# Patient Record
Sex: FEMALE | Race: WHITE | Employment: UNEMPLOYED | URBAN - METROPOLITAN AREA
[De-identification: names, ages, dates, MRNs, and addresses within clinical notes are randomized per-mention and may not be internally consistent; named-entity substitution may affect disease eponyms.]

---

## 2023-02-15 ENCOUNTER — ANESTHESIA EVENT (OUTPATIENT)
Dept: ENDOSCOPY | Age: 38
End: 2023-02-15
Payer: COMMERCIAL

## 2023-02-16 ENCOUNTER — ANESTHESIA (OUTPATIENT)
Dept: ENDOSCOPY | Age: 38
End: 2023-02-16
Payer: COMMERCIAL

## 2023-02-16 ENCOUNTER — HOSPITAL ENCOUNTER (OUTPATIENT)
Age: 38
Setting detail: OUTPATIENT SURGERY
Discharge: HOME | End: 2023-02-16
Attending: INTERNAL MEDICINE | Admitting: INTERNAL MEDICINE
Payer: COMMERCIAL

## 2023-02-16 VITALS
DIASTOLIC BLOOD PRESSURE: 81 MMHG | HEIGHT: 64 IN | WEIGHT: 115 LBS | OXYGEN SATURATION: 99 % | TEMPERATURE: 97.4 F | HEART RATE: 86 BPM | BODY MASS INDEX: 19.63 KG/M2 | SYSTOLIC BLOOD PRESSURE: 117 MMHG | RESPIRATION RATE: 18 BRPM

## 2023-02-16 DIAGNOSIS — R13.12 OROPHARYNGEAL DYSPHAGIA: ICD-10-CM

## 2023-02-16 DIAGNOSIS — K62.89 NODULE OF ANUS: Primary | ICD-10-CM

## 2023-02-16 DIAGNOSIS — K50.80 CROHN'S DISEASE OF BOTH SMALL AND LARGE INTESTINE WITHOUT COMPLICATION (HCC): ICD-10-CM

## 2023-02-16 LAB — PREGNANCY, URINE: NEGATIVE

## 2023-02-16 PROCEDURE — 88305 TISSUE EXAM BY PATHOLOGIST: CPT

## 2023-02-16 PROCEDURE — 2580000003 HC RX 258: Performed by: ANESTHESIOLOGY

## 2023-02-16 PROCEDURE — 6360000002 HC RX W HCPCS: Performed by: NURSE ANESTHETIST, CERTIFIED REGISTERED

## 2023-02-16 PROCEDURE — 3700000001 HC ADD 15 MINUTES (ANESTHESIA): Performed by: INTERNAL MEDICINE

## 2023-02-16 PROCEDURE — 84703 CHORIONIC GONADOTROPIN ASSAY: CPT

## 2023-02-16 PROCEDURE — 3609010300 HC COLONOSCOPY W/BIOPSY SINGLE/MULTIPLE: Performed by: INTERNAL MEDICINE

## 2023-02-16 PROCEDURE — 2500000003 HC RX 250 WO HCPCS: Performed by: NURSE ANESTHETIST, CERTIFIED REGISTERED

## 2023-02-16 PROCEDURE — 3609012700 HC EGD DILATION SAVORY: Performed by: INTERNAL MEDICINE

## 2023-02-16 PROCEDURE — 7100000011 HC PHASE II RECOVERY - ADDTL 15 MIN: Performed by: INTERNAL MEDICINE

## 2023-02-16 PROCEDURE — 7100000010 HC PHASE II RECOVERY - FIRST 15 MIN: Performed by: INTERNAL MEDICINE

## 2023-02-16 PROCEDURE — 2709999900 HC NON-CHARGEABLE SUPPLY: Performed by: INTERNAL MEDICINE

## 2023-02-16 PROCEDURE — C1769 GUIDE WIRE: HCPCS | Performed by: INTERNAL MEDICINE

## 2023-02-16 PROCEDURE — 3700000000 HC ANESTHESIA ATTENDED CARE: Performed by: INTERNAL MEDICINE

## 2023-02-16 RX ORDER — MELATONIN
COMMUNITY
Start: 2023-01-15

## 2023-02-16 RX ORDER — ONDANSETRON 4 MG/1
TABLET, FILM COATED ORAL
COMMUNITY
Start: 2022-12-08

## 2023-02-16 RX ORDER — SODIUM CHLORIDE, SODIUM LACTATE, POTASSIUM CHLORIDE, CALCIUM CHLORIDE 600; 310; 30; 20 MG/100ML; MG/100ML; MG/100ML; MG/100ML
INJECTION, SOLUTION INTRAVENOUS CONTINUOUS
Status: DISCONTINUED | OUTPATIENT
Start: 2023-02-16 | End: 2023-02-16 | Stop reason: HOSPADM

## 2023-02-16 RX ORDER — ONDANSETRON 2 MG/ML
INJECTION INTRAMUSCULAR; INTRAVENOUS PRN
Status: DISCONTINUED | OUTPATIENT
Start: 2023-02-16 | End: 2023-02-16 | Stop reason: SDUPTHER

## 2023-02-16 RX ORDER — NORETHINDRONE ACETATE AND ETHINYL ESTRADIOL AND FERROUS FUMARATE 1MG-20(24)
KIT ORAL
COMMUNITY
Start: 2023-02-13

## 2023-02-16 RX ORDER — CITALOPRAM 40 MG/1
40 TABLET ORAL DAILY
COMMUNITY

## 2023-02-16 RX ORDER — FENTANYL CITRATE 50 UG/ML
INJECTION, SOLUTION INTRAMUSCULAR; INTRAVENOUS PRN
Status: DISCONTINUED | OUTPATIENT
Start: 2023-02-16 | End: 2023-02-16 | Stop reason: SDUPTHER

## 2023-02-16 RX ORDER — DEXAMETHASONE SODIUM PHOSPHATE 4 MG/ML
INJECTION, SOLUTION INTRA-ARTICULAR; INTRALESIONAL; INTRAMUSCULAR; INTRAVENOUS; SOFT TISSUE PRN
Status: DISCONTINUED | OUTPATIENT
Start: 2023-02-16 | End: 2023-02-16 | Stop reason: SDUPTHER

## 2023-02-16 RX ORDER — LIDOCAINE HYDROCHLORIDE 20 MG/ML
INJECTION, SOLUTION EPIDURAL; INFILTRATION; INTRACAUDAL; PERINEURAL PRN
Status: DISCONTINUED | OUTPATIENT
Start: 2023-02-16 | End: 2023-02-16 | Stop reason: SDUPTHER

## 2023-02-16 RX ORDER — PROPOFOL 10 MG/ML
INJECTION, EMULSION INTRAVENOUS PRN
Status: DISCONTINUED | OUTPATIENT
Start: 2023-02-16 | End: 2023-02-16 | Stop reason: SDUPTHER

## 2023-02-16 RX ORDER — LANOLIN ALCOHOL/MO/W.PET/CERES
CREAM (GRAM) TOPICAL
COMMUNITY
Start: 2023-01-15

## 2023-02-16 RX ADMIN — SODIUM CHLORIDE, POTASSIUM CHLORIDE, SODIUM LACTATE AND CALCIUM CHLORIDE: 600; 310; 30; 20 INJECTION, SOLUTION INTRAVENOUS at 11:40

## 2023-02-16 RX ADMIN — PROPOFOL 150 MCG/KG/MIN: 10 INJECTION, EMULSION INTRAVENOUS at 12:42

## 2023-02-16 RX ADMIN — PROPOFOL 50 MG: 10 INJECTION, EMULSION INTRAVENOUS at 12:53

## 2023-02-16 RX ADMIN — LIDOCAINE HYDROCHLORIDE 100 MG: 20 INJECTION, SOLUTION EPIDURAL; INFILTRATION; INTRACAUDAL; PERINEURAL at 12:42

## 2023-02-16 RX ADMIN — FENTANYL CITRATE 50 MCG: 50 INJECTION, SOLUTION INTRAMUSCULAR; INTRAVENOUS at 12:44

## 2023-02-16 RX ADMIN — PROPOFOL 50 MG: 10 INJECTION, EMULSION INTRAVENOUS at 12:47

## 2023-02-16 RX ADMIN — DEXAMETHASONE SODIUM PHOSPHATE 4 MG: 4 INJECTION, SOLUTION INTRAMUSCULAR; INTRAVENOUS at 12:45

## 2023-02-16 RX ADMIN — PROPOFOL 100 MG: 10 INJECTION, EMULSION INTRAVENOUS at 12:42

## 2023-02-16 RX ADMIN — PROPOFOL 50 MG: 10 INJECTION, EMULSION INTRAVENOUS at 12:51

## 2023-02-16 RX ADMIN — ONDANSETRON 4 MG: 2 INJECTION INTRAMUSCULAR; INTRAVENOUS at 13:32

## 2023-02-16 RX ADMIN — PROPOFOL 50 MG: 10 INJECTION, EMULSION INTRAVENOUS at 12:43

## 2023-02-16 RX ADMIN — PROPOFOL 30 MG: 10 INJECTION, EMULSION INTRAVENOUS at 13:11

## 2023-02-16 RX ADMIN — PROPOFOL 40 MG: 10 INJECTION, EMULSION INTRAVENOUS at 13:16

## 2023-02-16 RX ADMIN — PROPOFOL 50 MG: 10 INJECTION, EMULSION INTRAVENOUS at 13:28

## 2023-02-16 RX ADMIN — PROPOFOL 50 MG: 10 INJECTION, EMULSION INTRAVENOUS at 12:44

## 2023-02-16 RX ADMIN — PROPOFOL 50 MG: 10 INJECTION, EMULSION INTRAVENOUS at 13:10

## 2023-02-16 RX ADMIN — FENTANYL CITRATE 50 MCG: 50 INJECTION, SOLUTION INTRAMUSCULAR; INTRAVENOUS at 12:42

## 2023-02-16 ASSESSMENT — PAIN DESCRIPTION - DESCRIPTORS: DESCRIPTORS: CRAMPING

## 2023-02-16 ASSESSMENT — PAIN SCALES - GENERAL
PAINLEVEL_OUTOF10: 2

## 2023-02-16 ASSESSMENT — PAIN - FUNCTIONAL ASSESSMENT
PAIN_FUNCTIONAL_ASSESSMENT: 0-10
PAIN_FUNCTIONAL_ASSESSMENT: ACTIVITIES ARE NOT PREVENTED

## 2023-02-16 NOTE — PROCEDURES
EGD/COLONOSCOPY     Patient: Eliz Randall MRN: [id number]   YOB: 1985 Age: 30 y.o. Sex: female   Unit: [address] [place], [place] [address]e N ENDOSCOPY Room/Bed: Endo Pool/NONE Location: [address] [place] [address]    Admitting Physician: Estefany Fischer     Primary Care Physician: Cesar Llamas MD      DATE OF PROCEDURE: 2/16/2023  PROCEDURE: EGD/Colonoscopy    PREOPERATIVE DIAGNOSIS: Crohn's disease of both small and large intestine without complication ([address] [V50.98]  Oropharyngeal dysphagia [R13.12]  HPI: Colonoscopy for large and small bowel Crohn's disease. EGD for esophageal dysphagia. 79-year-old female with history of Crohn's disease as above status post ileocecectomy with primary anastomosis. She has been on stable Remicade for years. She does have some mild dysphagia. Of note, she has noted some gradually worsening intermittent anal pain and sometimes bleeding which she attributes to hemorrhoids. ENDOSCOPIST: Alma Marroquin MD    POSTOPERATIVE DIAGNOSIS:    -Mild lower esophageal Schatzki ring, dilated up to 50 [address] Susan with mild mucosal abrasion on second look esophagoscopy. -Stomach and duodenum unremarkable to the second portion.  -Limited views in the neoTI unremarkable in the distal 3 cm  -Evidence of ileocectomy with end to side ileocolonic anastomosis, with a few small erosions at the anastomosis  -Unremarkable colon with quadrantic biopsies every 10 cm in the proximal and distal colon  -External skin tags and external hemorrhoids.  -Nodular posterior anal fissure, with presumed benign nodule a dentate line, biopsied.   Another 2 mm nodule at the lateral dentate line, removed with biopsy forcep    PLAN:   -We will follow-up with biopsy results and update the patient in about 1 to 2 weeks.  -Continue Remicade.  -We will refer the patient to Dr. Lupis kennedy of colorectal surgery for proctoscopic exam to carefully biopsy and rule out any other perianal pathology that might contribute to the nodularity, and is well discuss any potential treatment options for her hemorrhoids    INFORMED CONSENT:  Informed consent for colonoscopy was obtained. The benefits and risks including adverse medicine reaction and perforation have been explained. The patient's questions were answered and the patient agreed to proceed. ASA: ASA 2 - Patient with mild systemic disease with no functional limitations     SEDATION: MAC    The patient's vital signs, cardiac status, pulmonary status, abdominal status and mental status were stable for the procedure. The patient's vital signs and respiratory function as monitored by oxygen saturation remained stable. COLON PREPARATION:  The patient was given a split colon preparation and the preparation was adequate. EGD/Colonoscopy Procedure Details:      Procedure Details: The Olympus videoendoscope was inserted into the mouth and carefully passed into the esophagus, through the stomach and to the distal duodenum. Retroflexion in the cardia and fundus was performed. Next, a digital rectal exam was performed. The Olympus videocolonoscope  was inserted in the rectum and carefully advanced to the cecum as identified by IC valve, crow's foot appearance and appendix. Retroflexion in the right colon was performed more than the endoscope was straightened and advanced back to the cecum. The colonoscope was slowly withdrawn with careful inspection around and between folds. Retroflexion in the rectum was performed. Cecum Intubated: No: Because of surgical anatomy. See report    Findings: The esophagus is normal.  The Z-line, gastroesophageal junction and diaphragm are all at about the same level. There was a trivial Schatzki ring. Wire-guided savory dilation was performed from 42 up to 50 [place] Susan with minor mucosal abrasion at the Schatzki ring. The scope easily passed into the stomach.  The mucosa of the cardia, fundus, body and antrum of the stomach is normal.  The pylorus is patent and of normal contour. The scope easily advanced into the duodenal bulb and down to the 2nd portion of the duodenum. The neoterminal ileum was examined approximately 3 cm proximal to the ileocolonic anastomosis and was unremarkable. The ileal colonic anastomosis itself was a healthy appearing side to and, with a few erosions at the anastomosis itself which we did not believe needed to be biopsied. The residual a sending and transverse colon were unremarkable with quadrantic biopsies every 10 cm. The descending, sigmoid and rectum were unremarkable with quadrantic biopsies every 10 cm. There were 2 small nodules at the dentate line, a 2 mm nodule on the lateral aspect of the dentate line with the patient in the supine position, and a larger 4 mm nodule at the dentate line on the posterior aspect of the anal canal.  Both of these were biopsied. There was a nodular posterior anal fissure in the anal canal that we did not attempt a biopsy. On digital rectal exam, there were abundant skin tags and external hemorrhoids without thrombosis.       Estimated Blood Loss:  Minimal  Complications: None    Signed By: Ben Sharp MD

## 2023-02-16 NOTE — ANESTHESIA PRE PROCEDURE
Department of Anesthesiology  Preprocedure Note       Name:  Eliz Randall   Age:  30 y.o.  :  1985                                          MRN:  [id number]         Date:  2023      Surgeon: Deisi Signs):  Oscar Vallejo MD    Procedure: Procedure(s):  ESOPHAGOGASTRODUODENOSCOPY  COLONOSCOPY    Medications prior to admission:   Prior to Admission medications    Not on File       Current medications:    No current facility-administered medications for this encounter. Allergies: Allergies   Allergen Reactions    Cefuroxime Axetil Nausea And Vomiting    Imidazole Antifungals Nausea And Vomiting    Metronidazole Nausea And Vomiting    Sulfa Antibiotics Nausea And Vomiting    Sulfamethoxazole-Trimethoprim Nausea And Vomiting       Problem List:  There is no problem list on file for this patient. Past Medical History:  History reviewed. No pertinent past medical history. Past Surgical History:  History reviewed. No pertinent surgical history. Social History:    Social History     Tobacco Use    Smoking status: Not on file    Smokeless tobacco: Not on file   Substance Use Topics    Alcohol use: Not on file                                Counseling given: Not Answered      Vital Signs (Current): There were no vitals filed for this visit. BP Readings from Last 3 Encounters:   No data found for BP       NPO Status:                                                                                 BMI:   Wt Readings from Last 3 Encounters:   No data found for Wt     There is no height or weight on file to calculate BMI.    CBC: No results found for: WBC, RBC, HGB, HCT, MCV, RDW, PLT    CMP: No results found for: NA, K, CL, CO2, BUN, CREATININE, GFRAA, AGRATIO, LABGLOM, GLUCOSE, GLU, PROT, CALCIUM, BILITOT, ALKPHOS, AST, ALT    POC Tests: No results for input(s): POCGLU, POCNA, POCK, POCCL, POCBUN, POCHEMO, POCHCT in the last 72 hours.     Coags: No results found for: PROTIME, INR, APTT    HCG (If Applicable): No results found for: PREGTESTUR, PREGSERUM, HCG, HCGQUANT     ABGs: No results found for: PHART, PO2ART, JGM9DSQ, KHT5TUM, BEART, J8OMNBAF     Type & Screen (If Applicable):  No results found for: LABABO, LABRH    Drug/Infectious Status (If Applicable):  No results found for: HIV, HEPCAB    COVID-19 Screening (If Applicable): No results found for: COVID19        Anesthesia Evaluation  Patient summary reviewed and Nursing notes reviewed no history of anesthetic complications:   Airway: Mallampati: I  TM distance: >3 FB   Neck ROM: full  Mouth opening: > = 3 FB   Dental: normal exam         Pulmonary:Negative Pulmonary ROS and normal exam                               Cardiovascular:Negative CV ROS                      Neuro/Psych:   Negative Neuro/Psych ROS              GI/Hepatic/Renal:            ROS comment: Crohn's disease of both small and large intestine.   Endo/Other: Negative Endo/Other ROS                    Abdominal:             Vascular: negative vascular ROS.         Other Findings:           Anesthesia Plan      MAC     ASA 2       Induction: intravenous.    MIPS: Prophylactic antiemetics administered.  Anesthetic plan and risks discussed with patient.      Plan discussed with CRNA.    Attending anesthesiologist reviewed and agrees with Preprocedure content                Merlin Gutierrez MD   2/16/2023

## 2023-02-16 NOTE — PROGRESS NOTES
Discharge instructions given to patient and Mother at bedside. IV dc/d, dressed and discharged home. Wife driving her home. Patient voices understanding of consult for Dr. Arsen Rebolledo.

## 2023-02-16 NOTE — H&P
Gastroenterology Outpatient History and Physical     Patient: Nya Badillo MRN: [id number] Sex: female   YOB: 1985 Age: 30 y.o. Location: [address] [place] [address]    Date:2/16/2023  Primary Care Physician: Oj Leone MD         Patient: Nya Badillo    Physician: Pablo Correa MD    History of Present Illness: Colonoscopy for large and small bowel Crohn's disease. EGD for esophageal dysphagia. 29-year-old female with history of Crohn's disease as above status post ileocecectomy with primary anastomosis. She has been on stable Remicade for years. She does have some mild dysphagia. Of note, she has noted some gradually worsening intermittent anal pain and sometimes bleeding which she attributes to hemorrhoids. Review of Systems:  Weight Loss: No  Dysphagia: No  Dyspepsia: No  History:  Past Medical History:   Diagnosis Date    Crohn's disease ([address]     Kidney stone       Past Surgical History:   Procedure Laterality Date    ABDOMEN SURGERY        Social History     Socioeconomic History    Marital status:      Spouse name: None    Number of children: None    Years of education: None    Highest education level: None   Tobacco Use    Smoking status: Never    Smokeless tobacco: Never   Substance and Sexual Activity    Alcohol use: Yes     Comment: 1 drink weekly    Drug use: Never    Sexual activity: Yes     Partners: Male      History reviewed. No pertinent family history. Allergies: Allergies   Allergen Reactions    Cefuroxime Axetil Nausea And Vomiting    Imidazole Antifungals Nausea And Vomiting    Metronidazole Nausea And Vomiting    Sulfa Antibiotics Nausea And Vomiting    Sulfamethoxazole-Trimethoprim Nausea And Vomiting     Medications:   Prior to Admission medications    Medication Sig Start Date End Date Taking?  Authorizing Provider   citalopram (CELEXA) 40 MG tablet Take 40 mg by mouth daily   Yes Historical Provider, MD   inFLIXimab (REMICADE IV) Infuse intravenously Every 2 months   Yes Historical Provider, MD   vitamin D3 (CHOLECALCIFEROL) 25 MCG (1000 UT) TABS tablet TAKE 1 TABLET BY MOUTH EVERY DAY 1/15/23   Historical Provider, MD   vitamin B-12 (CYANOCOBALAMIN) 1000 MCG tablet TAKE 1 TABLET BY MOUTH EVERY DAY 1/15/23   Historical Provider, MD   [address] [facility] 1-20 MG-MCG(24) CHEW  2/13/23   Historical Provider, MD   ondansetron (ZOFRAN) 4 MG tablet TAKE 1 TABLET BY MOUTH EVERY 6 HOURS 12/8/22   Historical Provider, MD       Vital Signs: /83   Pulse 84   Temp 97 °F (36.1 °C) (Temporal)   Resp 18   Ht 5' 4\" (1.626 m)   Wt 115 lb (52.2 kg)   LMP 02/09/2023 Comment: Negative HCG for procedure 02/16/2026  SpO2 100%   BMI 19.74 kg/m²   Physical Exam:   Heart: regular rrr  Lungs: non-labored breathing  Mental status:  Alert and oriented    ASA score:  ASA 2 - Patient with mild systemic disease with no functional limitations{  Mallimpati score:  2     Planned Procedure: EGD/colon    Planned Sedation: Conscious sedation / Monitored anesthesia.     Signed By: Jai Johnson MD   February 16, 2023

## 2023-02-16 NOTE — ANESTHESIA POSTPROCEDURE EVALUATION
Department of Anesthesiology  Postprocedure Note    Patient: Rafa Pierre  MRN: [id number]  [place]: 1985  Date of evaluation: 2/16/2023      Procedure Summary     Date: 02/16/23 Room / Location: [address] [place] [address] Berkley Villanueva 03 / [facility]    Anesthesia Start: 0953 Anesthesia Stop: 3481    Procedures:       COLONOSCOPY WITH BIOPSY      EGD DILATION Diagnosis:       Crohn's disease of both small and large intestine without complication ([address]      Oropharyngeal dysphagia      (Crohn's disease of both small and large intestine without complication ([address] [J93.76])      (Oropharyngeal dysphagia [R13.12])    Surgeons: Gerardo Desouza MD Responsible Provider: Gerhardt Roach, MD    Anesthesia Type: MAC ASA Status: 2          Anesthesia Type: MAC    Noemy Phase I: Noemy Score: 10    Noemy Phase II: Noemy Score: 10      Anesthesia Post Evaluation    Patient location during evaluation: PACU  Patient participation: complete - patient participated  Level of consciousness: awake and alert  Airway patency: patent  Nausea & Vomiting: no nausea and no vomiting  Complications: no  Cardiovascular status: hemodynamically stable  Respiratory status: acceptable  Hydration status: euvolemic  Multimodal analgesia pain management approach

## 2023-02-16 NOTE — PROGRESS NOTES
Patient to room # SDS 35. Post op Procedure Summary  Date: 02/16/23 Room / Location: Laura Peraza 03 / [facility]   Anesthesia Start: 6287 Anesthesia Stop: 1910   Procedures:        COLONOSCOPY WITH BIOPSY       EGD DILATION Diagnosis:        Crohn's disease of both small and large intestine without complication ([address]       Oropharyngeal dysphagia       (Crohn's disease of both small and large intestine without complication ([address] [S61.87])       (Oropharyngeal dysphagia [R13.12])   Surgeons: Juice Flores MD Responsible Provider: Donna Hernandez MD   Anesthesia Type: MAC ASA Status: 2   Report received from ENDO RN at bedside. VS stable. Patient awake- denies any pain or nausea. Mother Alf Markham at bedside.

## 2023-02-16 NOTE — PROGRESS NOTES
Ambulatory Surgery/Procedure Discharge Note    Vitals:    02/16/23 1400   BP: 117/81   Pulse: 86   Resp: 18   Temp:    SpO2: 99%       In: 900 [I.V.:900]  Out: -     Restroom use offered before discharge. Yes    Pain assessment:  level of pain (1-10, 10 severe),   Pain Level: 2      Pt discharged to home. IV removed, tip intact and pressure applied. Pt and mother given discharge instructions and verbalized understanding. Patient discharged to home/self care.  Patient discharged via wheel chair by transporter to waiting family/S.O.       2/16/2023 2:51 PM

## 2023-02-16 NOTE — DISCHARGE INSTRUCTIONS
ENDOSCOPY DISCHARGE INSTRUCTIONS:    Call the physician that did your procedure for any questions or concern:    [facility]: [phone]  DR. DESI MEYER      ACTIVITY:    There are potential side effects to the medications used for sedation and anesthesia during your procedure. These include:  Dizziness or light-headedness, confusion or memory loss, delayed reaction times, loss of coordination, nausea and vomiting. Because of your increased risk for injury, we ask that you observe the following precautions: For the next 24 hours,  DO NOT operate an automobile, bicycle, motorcycle, , power tools or large equipment of any kind. Do not drink alcohol, sign any legal documents or make any legal decisions for 24 hours. Do not bend your head over lower than your heart. DO sit on the side of bed/couch awhile before getting up. Plan on bedrest or quiet relaxation today. You may resume normal activities in 24 hours. DIET:    Your first meal today should be light, avoiding spicy and fatty foods. If you tolerate this first meal, then you may advance to your regular diet unless otherwise advised by your physician. NORMAL SYMPTOMS:  -Mild sore throat if youve had an EGD   -Gaseous discomfort    NOTIFY YOUR PHYSICIAN IF THESE SYMPTOMS OCCUR:  1. Fever (greater than 100)  5. Increased abdominal bloating  2. Severe pain    6. Excessive bleeding  3. Nausea and vomiting  7. Chest pain                                                                    4. Chills    8. Shortness of breath    ADDITIONAL INSTRUCTIONS:    Biopsy results: Call [address],[facility] for biopsy results in 1 week    Educational Information:          Please review these discharge instructions this evening or tomorrow for  information you may have forgotten. We want to thank you for choosing the [facility] as your health care provider. We always strive to provide you with excellent care while you are here.  You may receive a survey in the mail regarding your care. We would appreciate you taking a few minutes of your time to complete this survey.

## 2023-03-02 ENCOUNTER — OFFICE VISIT (OUTPATIENT)
Dept: SURGERY | Age: 38
End: 2023-03-02

## 2023-03-02 VITALS
HEART RATE: 87 BPM | SYSTOLIC BLOOD PRESSURE: 118 MMHG | OXYGEN SATURATION: 100 % | DIASTOLIC BLOOD PRESSURE: 79 MMHG | BODY MASS INDEX: 19.81 KG/M2 | HEIGHT: 64 IN | WEIGHT: 116 LBS | TEMPERATURE: 98 F

## 2023-03-02 DIAGNOSIS — K60.2 FISSURE IN ANO: ICD-10-CM

## 2023-03-02 DIAGNOSIS — K50.019 CROHN'S DISEASE OF SMALL INTESTINE WITH COMPLICATION (HCC): Primary | ICD-10-CM

## 2023-03-02 PROCEDURE — 99024 POSTOP FOLLOW-UP VISIT: CPT | Performed by: SURGERY

## 2023-03-02 NOTE — PROGRESS NOTES
Subjective:     Patient is a 30 y.o. woman with Crohn's and anal nodules    The patient is referred by Dr. Niall Palacios MD. Results of consultation will be shared via electronic medical record    HPI: Ms. Meagan Valles is a 30year old woman with a history of Crohn's disease and perianal nodules. She has rare pain with BM only when stools are hard. Prior small bowel resection and prior terminal ileum resection. She has been maintained on Remicade for several years. She was diagnosed with Crohn's as a child. Past Medical History:   Diagnosis Date    Crohn's disease ([facility]     Kidney stone       Past Surgical History:   Procedure Laterality Date    ABDOMEN SURGERY      COLONOSCOPY N/A 2/16/2023    COLONOSCOPY WITH BIOPSY performed by Niall Palacios MD at [address] 2/16/2023    EGD DILATION performed by Niall Palacios MD at [facility]      Not in a hospital admission. Allergies   Allergen Reactions    Cefuroxime Axetil Nausea And Vomiting    Imidazole Antifungals Nausea And Vomiting    Metronidazole Nausea And Vomiting    Sulfa Antibiotics Nausea And Vomiting    Sulfamethoxazole-Trimethoprim Nausea And Vomiting      Social History     Tobacco Use    Smoking status: Never    Smokeless tobacco: Never   Substance Use Topics    Alcohol use: Yes     Comment: 1 drink weekly      FH: no FH of Crohn's: 2 cousins with colitis    Review of Systems    GEN: reviewed and negative except as noted in HPI. GI: reviewed and negative except as noted in HPI. Objective:     GEN: appears well, no distress, appears stated age  PSYCH: normal mood, normal affect  NECK: no neck masses, trachea midline  ENT: moist oral mucosa; anicteric  SKIN: no rash or jaundice  CV: regular heart rate and rhythm  PULM: normal respiratory effort, no wheezing  GI: soft non tender abdomen. Normal bowel sounds  RECTAL: examined with chaperone Saravanan Costa MA. Large Crohn's skin tag anterior.  Some external hemorrhoids. Posterior chronic appearing anal fissure. No fistula   EXT/NEURO: normal gait, strength/sensation grossly intact in all extremities    Path report:    \"  FINAL DIAGNOSIS:     A. Proximal colon biopsy (r/o dysplasia):     - No diagnostic alterations (no evidence of dysplasia). B. Distal colon biopsy (r/o dysplasia):     - No diagnostic alterations (no evidence of dysplasia). C. Distal rectal nodule biopsy:     - Granulation tissue. D. Distal rectal nodule biopsy at 12:00:     - Active chronic colitis (no diagnostic evidence of Crohn's). \"    Colonoscopy report:    \"  The neoterminal ileum was examined approximately 3 cm proximal to the ileocolonic anastomosis and was unremarkable. The ileal colonic anastomosis itself was a healthy appearing side to and, with a few erosions at the anastomosis itself which we did not believe needed to be biopsied. The residual a sending and transverse colon were unremarkable with quadrantic biopsies every 10 cm. The descending, sigmoid and rectum were unremarkable with quadrantic biopsies every 10 cm. There were 2 small nodules at the dentate line, a 2 mm nodule on the lateral aspect of the dentate line with the patient in the supine position, and a larger 4 mm nodule at the dentate line on the posterior aspect of the anal canal.  Both of these were biopsied. There was a nodular posterior anal fissure in the anal canal that we did not attempt a biopsy. On digital rectal exam, there were abundant skin tags and external hemorrhoids without thrombosis. \"      Assessment:     Crohn's  Anal nodules   Fissure    Plan:     Start topical CCB cream to see if this helps heal the fissure. It is in the posterior midline.      Discussed for tags/hemorrhoids I do not recommend surgical excision as poor healing with Crohn's    See me 6 weeks to see if topical helping     Mary Chávez M.D.  3/2/23   9:45 AM

## 2023-03-02 NOTE — Clinical Note
Dipak Gonzales,  Thanks for sending MsNatalia Kerline Favian.  I'm going to start her on topical CCB to see if this resolves her fissure  FARHANA

## 2024-07-16 ENCOUNTER — TELEPHONE (OUTPATIENT)
Dept: SURGERY | Age: 39
End: 2024-07-16

## 2024-07-16 NOTE — TELEPHONE ENCOUNTER
The patient called requesting a refill of topical CCB cream prescribed 3/2023.    The patient's pharmacy is [facility] on [address].    Please call: [phone]

## 2024-07-17 NOTE — TELEPHONE ENCOUNTER
Spoke to patient regarding closest compound pharmacy. Patient states that GI doctor sent in order so she no longer needs prescription. She will follow up with Dr. Piedra.

## 2024-07-28 ENCOUNTER — HOSPITAL ENCOUNTER (EMERGENCY)
Age: 39
Discharge: HOME | End: 2024-07-28
Attending: STUDENT IN AN ORGANIZED HEALTH CARE EDUCATION/TRAINING PROGRAM
Payer: COMMERCIAL

## 2024-07-28 ENCOUNTER — APPOINTMENT (OUTPATIENT)
Dept: CT IMAGING | Age: 39
End: 2024-07-28
Payer: COMMERCIAL

## 2024-07-28 VITALS
WEIGHT: 115 LBS | OXYGEN SATURATION: 97 % | BODY MASS INDEX: 19.63 KG/M2 | SYSTOLIC BLOOD PRESSURE: 103 MMHG | RESPIRATION RATE: 16 BRPM | TEMPERATURE: 98.8 F | DIASTOLIC BLOOD PRESSURE: 71 MMHG | HEART RATE: 80 BPM | HEIGHT: 64 IN

## 2024-07-28 DIAGNOSIS — K62.89 RECTAL PAIN: Primary | ICD-10-CM

## 2024-07-28 DIAGNOSIS — E87.6 HYPOKALEMIA: ICD-10-CM

## 2024-07-28 DIAGNOSIS — Z87.19 HX OF CROHN'S DISEASE: ICD-10-CM

## 2024-07-28 DIAGNOSIS — E83.42 HYPOMAGNESEMIA: ICD-10-CM

## 2024-07-28 LAB
ALBUMIN SERPL-MCNC: 4 G/DL (ref 3.4–5)
ALBUMIN/GLOB SERPL: 1.2 {RATIO} (ref 1.1–2.2)
ALP SERPL-CCNC: 54 U/L (ref 40–129)
ALT SERPL-CCNC: 14 U/L (ref 10–40)
ANION GAP SERPL CALCULATED.3IONS-SCNC: 8 MMOL/L (ref 3–16)
AST SERPL-CCNC: 20 U/L (ref 15–37)
BACTERIA URNS QL MICRO: ABNORMAL /HPF
BASOPHILS # BLD: 0 K/UL (ref 0–0.2)
BASOPHILS NFR BLD: 0.1 %
BILIRUB SERPL-MCNC: 0.7 MG/DL (ref 0–1)
BILIRUB UR QL STRIP.AUTO: ABNORMAL
BUN SERPL-MCNC: 8 MG/DL (ref 7–20)
CALCIUM SERPL-MCNC: 8.4 MG/DL (ref 8.3–10.6)
CHLORIDE SERPL-SCNC: 102 MMOL/L (ref 99–110)
CLARITY UR: ABNORMAL
CO2 SERPL-SCNC: 24 MMOL/L (ref 21–32)
COLOR UR: ABNORMAL
CREAT SERPL-MCNC: 0.6 MG/DL (ref 0.6–1.1)
DEPRECATED RDW RBC AUTO: 12.9 % (ref 12.4–15.4)
EOSINOPHIL # BLD: 0 K/UL (ref 0–0.6)
EOSINOPHIL NFR BLD: 0.2 %
EPI CELLS #/AREA URNS HPF: ABNORMAL /HPF (ref 0–5)
GFR SERPLBLD CREATININE-BSD FMLA CKD-EPI: >90 ML/MIN/{1.73_M2}
GLUCOSE SERPL-MCNC: 131 MG/DL (ref 70–99)
GLUCOSE UR STRIP.AUTO-MCNC: NEGATIVE MG/DL
HCG UR QL: NEGATIVE
HCT VFR BLD AUTO: 35.6 % (ref 36–48)
HGB BLD-MCNC: 11.9 G/DL (ref 12–16)
HGB UR QL STRIP.AUTO: NEGATIVE
INR PPP: 1.05 (ref 0.85–1.15)
KETONES UR STRIP.AUTO-MCNC: 15 MG/DL
LACTATE BLDV-SCNC: 1.1 MMOL/L (ref 0.4–1.9)
LEUKOCYTE ESTERASE UR QL STRIP.AUTO: NEGATIVE
LYMPHOCYTES # BLD: 0.7 K/UL (ref 1–5.1)
LYMPHOCYTES NFR BLD: 8.5 %
MAGNESIUM SERPL-MCNC: 1.7 MG/DL (ref 1.8–2.4)
MCH RBC QN AUTO: 29.8 PG (ref 26–34)
MCHC RBC AUTO-ENTMCNC: 33.5 G/DL (ref 31–36)
MCV RBC AUTO: 89 FL (ref 80–100)
MONOCYTES # BLD: 0.4 K/UL (ref 0–1.3)
MONOCYTES NFR BLD: 5 %
MUCOUS THREADS #/AREA URNS LPF: ABNORMAL /LPF
NEUTROPHILS # BLD: 7.3 K/UL (ref 1.7–7.7)
NEUTROPHILS NFR BLD: 86.2 %
NITRITE UR QL STRIP.AUTO: NEGATIVE
PH UR STRIP.AUTO: 5.5 [PH] (ref 5–8)
PLATELET # BLD AUTO: 273 K/UL (ref 135–450)
PMV BLD AUTO: 7.3 FL (ref 5–10.5)
POTASSIUM SERPL-SCNC: 3.3 MMOL/L (ref 3.5–5.1)
PROT SERPL-MCNC: 7.3 G/DL (ref 6.4–8.2)
PROT UR STRIP.AUTO-MCNC: ABNORMAL MG/DL
PROTHROMBIN TIME: 13.9 SEC (ref 11.9–14.9)
RBC # BLD AUTO: 4 M/UL (ref 4–5.2)
RBC #/AREA URNS HPF: ABNORMAL /HPF (ref 0–4)
RENAL EPI CELLS #/AREA UR COMP ASSIST: ABNORMAL /HPF (ref 0–1)
SODIUM SERPL-SCNC: 134 MMOL/L (ref 136–145)
SP GR UR STRIP.AUTO: >=1.03 (ref 1–1.03)
UA COMPLETE W REFLEX CULTURE PNL UR: YES
UA DIPSTICK W REFLEX MICRO PNL UR: YES
URN SPEC COLLECT METH UR: ABNORMAL
UROBILINOGEN UR STRIP-ACNC: 0.2 E.U./DL
WBC # BLD AUTO: 8.4 K/UL (ref 4–11)
WBC #/AREA URNS HPF: ABNORMAL /HPF (ref 0–5)

## 2024-07-28 PROCEDURE — 80053 COMPREHEN METABOLIC PANEL: CPT

## 2024-07-28 PROCEDURE — 85610 PROTHROMBIN TIME: CPT

## 2024-07-28 PROCEDURE — 74177 CT ABD & PELVIS W/CONTRAST: CPT

## 2024-07-28 PROCEDURE — 87086 URINE CULTURE/COLONY COUNT: CPT

## 2024-07-28 PROCEDURE — 85025 COMPLETE CBC W/AUTO DIFF WBC: CPT

## 2024-07-28 PROCEDURE — 6360000004 HC RX CONTRAST MEDICATION: Performed by: STUDENT IN AN ORGANIZED HEALTH CARE EDUCATION/TRAINING PROGRAM

## 2024-07-28 PROCEDURE — 84703 CHORIONIC GONADOTROPIN ASSAY: CPT

## 2024-07-28 PROCEDURE — 83605 ASSAY OF LACTIC ACID: CPT

## 2024-07-28 PROCEDURE — 81001 URINALYSIS AUTO W/SCOPE: CPT

## 2024-07-28 PROCEDURE — 83735 ASSAY OF MAGNESIUM: CPT

## 2024-07-28 PROCEDURE — 99285 EMERGENCY DEPT VISIT HI MDM: CPT

## 2024-07-28 PROCEDURE — 36415 COLL VENOUS BLD VENIPUNCTURE: CPT

## 2024-07-28 RX ORDER — POTASSIUM CHLORIDE 20 MEQ/1
40 TABLET, EXTENDED RELEASE ORAL ONCE
Status: DISCONTINUED | OUTPATIENT
Start: 2024-07-28 | End: 2024-07-28 | Stop reason: HOSPADM

## 2024-07-28 RX ORDER — DOCUSATE SODIUM 100 MG/1
100 CAPSULE, LIQUID FILLED ORAL 2 TIMES DAILY
Qty: 60 CAPSULE | Refills: 0 | Status: SHIPPED | OUTPATIENT
Start: 2024-07-28 | End: 2024-08-27

## 2024-07-28 RX ORDER — LANOLIN ALCOHOL/MO/W.PET/CERES
400 CREAM (GRAM) TOPICAL ONCE
Status: DISCONTINUED | OUTPATIENT
Start: 2024-07-28 | End: 2024-07-28 | Stop reason: HOSPADM

## 2024-07-28 RX ADMIN — IOPAMIDOL 75 ML: 755 INJECTION, SOLUTION INTRAVENOUS at 15:31

## 2024-07-28 ASSESSMENT — PAIN - FUNCTIONAL ASSESSMENT
PAIN_FUNCTIONAL_ASSESSMENT: 0-10
PAIN_FUNCTIONAL_ASSESSMENT: 0-10

## 2024-07-28 ASSESSMENT — PAIN DESCRIPTION - LOCATION: LOCATION: ABDOMEN

## 2024-07-28 ASSESSMENT — PAIN DESCRIPTION - DESCRIPTORS: DESCRIPTORS: ACHING

## 2024-07-28 ASSESSMENT — PAIN SCALES - GENERAL
PAINLEVEL_OUTOF10: 4
PAINLEVEL_OUTOF10: 3

## 2024-07-28 ASSESSMENT — PAIN DESCRIPTION - ORIENTATION: ORIENTATION: OTHER (COMMENT)

## 2024-07-28 NOTE — DISCHARGE INSTRUCTIONS
Please follow-up with your gastroenterologist.    Your CT scan showed Mildly prominent adnexal varices which can be seen with pelvic congestion syndrome.  Please follow-up with your gynecologist    You did have some mildly low potassium and magnesium levels.  We do recommend that you take a multivitamin

## 2024-07-28 NOTE — ED PROVIDER NOTES
[facility]  ED  EMERGENCY DEPARTMENT ENCOUNTER        Patient Name: Amy Gilligan  MRN: [id number]  Birthdate 1985  Date of evaluation: 7/28/2024  Provider: Rox Albert MD  PCP: Arturo Rapp MD  Note Started: 2:17 PM EDT 7/28/24      CHIEF COMPLAINT  Rectal pain         HISTORY & PHYSICAL     HISTORY OF PRESENT ILLNESS  History from : Patient    Limitations to history : None    Amy Gilligan is a 38 y.o. female  has a past medical history of Crohn's disease (HCC) and Kidney stone., who presents to the ED complaining of rectal pain.  Patient has a history of Crohn's disease.  She stated that she became constipated while on vacation and had to manually disimpact herself yesterday.  States it took approximately 45 minutes and there was a significant amount of bleeding.  She does report she has a history of bleeding hemorrhoids and also history of a fissure.  Since that time she has had some chills, she reports low-grade temperatures, Tmax 100.2.  She reports rectal pain.  She denies any pain in her abdomen.  She denies nausea or vomiting.  No vaginal bleeding or discharge.  No dysuria.    Old records reviewed: No pertinent information noted.    No other complaints, modifying factors or associated symptoms.  Nursing Notes were all reviewed and agreed with or any disagreements were addressed in the HPI.    I have reviewed the following from the nursing documentation.    Past Medical History:   Diagnosis Date    Crohn's disease (HCC)     Kidney stone      Past Surgical History:   Procedure Laterality Date    ABDOMEN SURGERY      COLONOSCOPY N/A 2/16/2023    COLONOSCOPY WITH BIOPSY performed by Arnold Smith MD at [facility] ENDOSCOPY    UPPER GASTROINTESTINAL ENDOSCOPY N/A 2/16/2023    EGD DILATION performed by Arnold Smith MD at [facility] ENDOSCOPY     History reviewed. No pertinent family history.  Social History     Socioeconomic History    Marital status:      Spouse name: Not on file

## 2024-07-29 LAB — BACTERIA UR CULT: NORMAL

## (undated) DEVICE — SAVARY GILLIARD WIRE GUIDE: Brand: SAVARY GILLIARD

## (undated) DEVICE — FORCEPS BX L240CM JAW DIA2.8MM L CAP W/ NDL MIC MESH TOOTH